# Patient Record
Sex: MALE | Race: WHITE
[De-identification: names, ages, dates, MRNs, and addresses within clinical notes are randomized per-mention and may not be internally consistent; named-entity substitution may affect disease eponyms.]

---

## 2019-09-17 ENCOUNTER — HOSPITAL ENCOUNTER (EMERGENCY)
Dept: HOSPITAL 94 - ER | Age: 29
LOS: 1 days | Discharge: HOME | End: 2019-09-18
Payer: COMMERCIAL

## 2019-09-17 VITALS — BODY MASS INDEX: 19.93 KG/M2 | HEIGHT: 75 IN | WEIGHT: 160.28 LBS

## 2019-09-17 VITALS — SYSTOLIC BLOOD PRESSURE: 110 MMHG | DIASTOLIC BLOOD PRESSURE: 74 MMHG

## 2019-09-17 DIAGNOSIS — F12.90: ICD-10-CM

## 2019-09-17 DIAGNOSIS — M54.5: Primary | ICD-10-CM

## 2019-09-17 DIAGNOSIS — Z59.0: ICD-10-CM

## 2019-09-17 DIAGNOSIS — F32.9: ICD-10-CM

## 2019-09-17 DIAGNOSIS — F41.9: ICD-10-CM

## 2019-09-17 DIAGNOSIS — Z56.0: ICD-10-CM

## 2019-09-17 DIAGNOSIS — G89.29: ICD-10-CM

## 2019-09-17 DIAGNOSIS — F17.200: ICD-10-CM

## 2019-09-17 DIAGNOSIS — Z79.899: ICD-10-CM

## 2019-09-17 PROCEDURE — 99283 EMERGENCY DEPT VISIT LOW MDM: CPT

## 2019-09-17 PROCEDURE — 96372 THER/PROPH/DIAG INJ SC/IM: CPT

## 2019-09-17 SDOH — ECONOMIC STABILITY - INCOME SECURITY: UNEMPLOYMENT, UNSPECIFIED: Z56.0

## 2019-09-17 SDOH — ECONOMIC STABILITY - HOUSING INSECURITY: HOMELESSNESS: Z59.0

## 2019-09-18 NOTE — NUR
[facility] CALL[facility] TO SEE IF OK TO SEND PT THERE AS PT REPROTS HE WAS PLANNING 
ON STAYING THERE TODAY AND HAS ALREADY". PT GIVEN NORFLEX, TORADOL AND NORCO 15 
MIN AGO.

## 2019-09-20 ENCOUNTER — HOSPITAL ENCOUNTER (EMERGENCY)
Dept: HOSPITAL 94 - ER | Age: 29
LOS: 2 days | Discharge: HOME | End: 2019-09-22
Payer: COMMERCIAL

## 2019-09-20 VITALS — WEIGHT: 160.34 LBS | HEIGHT: 75 IN | BODY MASS INDEX: 19.94 KG/M2

## 2019-09-20 DIAGNOSIS — F41.9: ICD-10-CM

## 2019-09-20 DIAGNOSIS — Z56.0: ICD-10-CM

## 2019-09-20 DIAGNOSIS — Z59.0: ICD-10-CM

## 2019-09-20 DIAGNOSIS — F12.90: ICD-10-CM

## 2019-09-20 DIAGNOSIS — R45.851: Primary | ICD-10-CM

## 2019-09-20 DIAGNOSIS — G89.29: ICD-10-CM

## 2019-09-20 DIAGNOSIS — Z79.899: ICD-10-CM

## 2019-09-20 LAB
ALBUMIN SERPL BCP-MCNC: 3.5 G/DL (ref 3.4–5)
ALBUMIN/GLOB SERPL: 1.1 {RATIO} (ref 1.1–1.5)
ALP SERPL-CCNC: 100 IU/L (ref 46–116)
ALT SERPL W P-5'-P-CCNC: 128 U/L (ref 12–78)
AMPHETAMINES UR QL SCN: NEGATIVE
ANION GAP SERPL CALCULATED.3IONS-SCNC: 7 MMOL/L (ref 8–16)
AST SERPL W P-5'-P-CCNC: 74 U/L (ref 10–37)
BARBITURATES UR QL SCN: NEGATIVE
BASOPHILS # BLD AUTO: 0.1 X10'3 (ref 0–0.2)
BASOPHILS NFR BLD AUTO: 0.7 % (ref 0–1)
BENZODIAZ UR QL SCN: NEGATIVE
BILIRUB SERPL-MCNC: 0.5 MG/DL (ref 0.1–1)
BUN SERPL-MCNC: 27 MG/DL (ref 7–18)
BUN/CREAT SERPL: 32.1 (ref 5.4–32)
BZE UR QL SCN: NEGATIVE
CALCIUM SERPL-MCNC: 8.6 MG/DL (ref 8.5–10.1)
CANNABINOIDS UR QL SCN: POSITIVE
CHLORIDE SERPL-SCNC: 106 MMOL/L (ref 99–107)
CLARITY UR: CLEAR
CO2 SERPL-SCNC: 29.7 MMOL/L (ref 24–32)
COLOR UR: YELLOW
CREAT SERPL-MCNC: 0.84 MG/DL (ref 0.6–1.1)
EOSINOPHIL # BLD AUTO: 0.1 X10'3 (ref 0–0.9)
EOSINOPHIL NFR BLD AUTO: 0.8 % (ref 0–6)
ERYTHROCYTE [DISTWIDTH] IN BLOOD BY AUTOMATED COUNT: 14 % (ref 11.5–14.5)
ETHANOL SERPL-MCNC: < 0.01 GM/DL (ref 0–0.01)
GFR SERPL CREATININE-BSD FRML MDRD: > 90 ML/MIN
GLUCOSE SERPL-MCNC: 115 MG/DL (ref 70–104)
GLUCOSE UR STRIP-MCNC: NEGATIVE MG/DL
HCT VFR BLD AUTO: 41.3 % (ref 42–52)
HGB BLD-MCNC: 13.9 G/DL (ref 14–17.9)
HGB UR QL STRIP: NEGATIVE
KETONES UR STRIP-MCNC: NEGATIVE MG/DL
LEUKOCYTE ESTERASE UR QL STRIP: NEGATIVE
LYMPHOCYTES # BLD AUTO: 3 X10'3 (ref 1.1–4.8)
LYMPHOCYTES NFR BLD AUTO: 38.8 % (ref 21–51)
MCH RBC QN AUTO: 30.2 PG (ref 27–31)
MCHC RBC AUTO-ENTMCNC: 33.8 G/DL (ref 33–36.5)
MCV RBC AUTO: 89.4 FL (ref 78–98)
METHADONE UR QL SCN: NEGATIVE
MONOCYTES # BLD AUTO: 0.6 X10'3 (ref 0–0.9)
MONOCYTES NFR BLD AUTO: 8.1 % (ref 2–12)
NEUTROPHILS # BLD AUTO: 4 X10'3 (ref 1.8–7.7)
NEUTROPHILS NFR BLD AUTO: 51.6 % (ref 42–75)
NITRITE UR QL STRIP: NEGATIVE
OPIATES UR QL SCN: NEGATIVE
PCP UR QL SCN: NEGATIVE
PH UR STRIP: 6 [PH] (ref 4.8–8)
PLATELET # BLD AUTO: 183 X10'3 (ref 140–440)
PMV BLD AUTO: 10 FL (ref 7.4–10.4)
POTASSIUM SERPL-SCNC: 3.7 MMOL/L (ref 3.5–5.1)
PROT SERPL-MCNC: 6.7 G/DL (ref 6.4–8.2)
PROT UR QL STRIP: NEGATIVE MG/DL
RBC # BLD AUTO: 4.62 X10'6 (ref 4.7–6.1)
SODIUM SERPL-SCNC: 143 MMOL/L (ref 135–145)
SP GR UR STRIP: 1.02 (ref 1–1.03)
URN COLLECT METHOD CLASS: (no result)
UROBILINOGEN UR STRIP-MCNC: 1 E.U/DL (ref 0.2–1)
WBC # BLD AUTO: 7.8 X10'3 (ref 4.5–11)

## 2019-09-20 PROCEDURE — 84443 ASSAY THYROID STIM HORMONE: CPT

## 2019-09-20 PROCEDURE — 80320 DRUG SCREEN QUANTALCOHOLS: CPT

## 2019-09-20 PROCEDURE — 99285 EMERGENCY DEPT VISIT HI MDM: CPT

## 2019-09-20 PROCEDURE — 36415 COLL VENOUS BLD VENIPUNCTURE: CPT

## 2019-09-20 PROCEDURE — 81003 URINALYSIS AUTO W/O SCOPE: CPT

## 2019-09-20 PROCEDURE — 80053 COMPREHEN METABOLIC PANEL: CPT

## 2019-09-20 PROCEDURE — 85025 COMPLETE CBC W/AUTO DIFF WBC: CPT

## 2019-09-20 PROCEDURE — 80305 DRUG TEST PRSMV DIR OPT OBS: CPT

## 2019-09-20 SDOH — ECONOMIC STABILITY - INCOME SECURITY: UNEMPLOYMENT, UNSPECIFIED: Z56.0

## 2019-09-20 SDOH — ECONOMIC STABILITY - HOUSING INSECURITY: HOMELESSNESS: Z59.0

## 2019-09-20 NOTE — NUR
Went in an assessed pt, pt was eaily arousable but was very fatigued and would 
fall back to sleep, pt answered yes/no to questions asked regarding mental 
status. pt said yes he still feels suicidal and that he would use a gun to kill 
himself.

## 2019-09-21 NOTE — NUR
Nurse to nurse report to PAL Orourke at [facility]. Additional labs will be faxed. 
Patients social security number does not match his name. [facility] would like SS 
# V

## 2019-09-21 NOTE — NUR
Patient is resting quietly. He has eaten a sandwich. Zyprexa and Ativan have 
been admistered. The patient is medication compliant and cooperative at this 
time.

## 2019-09-21 NOTE — NUR
Client is sleeping peacefully on right side with no signs of Distress.  
Respirations are even and unlabored.

## 2019-09-21 NOTE — NUR
Patient is awake and well oriented. His affect is flat. When asked how he is 
feeling the patient replies "I've been better. I'm depressed. I've just got to 
OD on drugs and die." Patient states he is overwhelmed. "I always hear voices 
that I can't understand." Patient is living on the streets. This patient ate 
his dinner. No psych meds are currently ordered. This writer will consult with 
the ER MD.

The patient is in direct view from the nursing station. Frequent rounding will 
be done for patient safety.

## 2019-09-21 NOTE — NUR
Report on this patient to PAL Orourke at [facility]. They would like updated labs, 
this will be faxed. [facility] would like verification on patients idenity, his 
SS number comes up to another name. A follow up call will be made to [facility] 
on this matter.

## 2019-09-22 VITALS — SYSTOLIC BLOOD PRESSURE: 109 MMHG | DIASTOLIC BLOOD PRESSURE: 55 MMHG

## 2019-09-22 NOTE — NUR
No behavioral issues and no somatic complaints. Currently resting with eyes 
closed in his bed. Respirations are even and unlabored at this time.

## 2019-09-22 NOTE — NUR
Received report on patient who was resting in bed to begin this shift. Alert 
and oriented x 4 and compliant with assessment and medications this am. No 
somatic complaints noted.

## 2019-09-22 NOTE — NUR
Resting in bed with eyes closed. Even, unlabored respirations were present. No 
behavioral issues have been noted as of this writing.

## 2019-09-24 ENCOUNTER — HOSPITAL ENCOUNTER (EMERGENCY)
Dept: HOSPITAL 94 - ER | Age: 29
LOS: 1 days | Discharge: HOME | End: 2019-09-25
Payer: COMMERCIAL

## 2019-09-24 VITALS — WEIGHT: 160.28 LBS | BODY MASS INDEX: 19.93 KG/M2 | HEIGHT: 75 IN

## 2019-09-24 DIAGNOSIS — M54.9: ICD-10-CM

## 2019-09-24 DIAGNOSIS — F12.90: ICD-10-CM

## 2019-09-24 DIAGNOSIS — F32.9: Primary | ICD-10-CM

## 2019-09-24 DIAGNOSIS — Z56.0: ICD-10-CM

## 2019-09-24 DIAGNOSIS — Z59.0: ICD-10-CM

## 2019-09-24 DIAGNOSIS — F41.9: ICD-10-CM

## 2019-09-24 DIAGNOSIS — G89.29: ICD-10-CM

## 2019-09-24 LAB
ALBUMIN SERPL BCP-MCNC: 3.8 G/DL (ref 3.4–5)
ALBUMIN/GLOB SERPL: 1.2 {RATIO} (ref 1.1–1.5)
ALP SERPL-CCNC: 102 IU/L (ref 46–116)
ALT SERPL W P-5'-P-CCNC: 139 U/L (ref 12–78)
AMPHETAMINES UR QL SCN: NEGATIVE
ANION GAP SERPL CALCULATED.3IONS-SCNC: 8 MMOL/L (ref 8–16)
AST SERPL W P-5'-P-CCNC: 67 U/L (ref 10–37)
BACTERIA URNS QL MICRO: (no result) /HPF
BARBITURATES UR QL SCN: NEGATIVE
BASOPHILS # BLD AUTO: 0.1 X10'3 (ref 0–0.2)
BASOPHILS NFR BLD AUTO: 0.7 % (ref 0–1)
BENZODIAZ UR QL SCN: NEGATIVE
BILIRUB SERPL-MCNC: 0.4 MG/DL (ref 0.1–1)
BUN SERPL-MCNC: 29 MG/DL (ref 7–18)
BUN/CREAT SERPL: 26.9 (ref 5.4–32)
BZE UR QL SCN: NEGATIVE
CALCIUM SERPL-MCNC: 9.2 MG/DL (ref 8.5–10.1)
CANNABINOIDS UR QL SCN: POSITIVE
CAOX CRY URNS QL MICRO: (no result) /HPF
CHLORIDE SERPL-SCNC: 106 MMOL/L (ref 99–107)
CLARITY UR: (no result)
CO2 SERPL-SCNC: 31.5 MMOL/L (ref 24–32)
COLOR UR: (no result)
CREAT SERPL-MCNC: 1.08 MG/DL (ref 0.6–1.1)
DEPRECATED SQUAMOUS URNS QL MICRO: (no result) /LPF
EOSINOPHIL # BLD AUTO: 0.1 X10'3 (ref 0–0.9)
EOSINOPHIL NFR BLD AUTO: 0.7 % (ref 0–6)
ERYTHROCYTE [DISTWIDTH] IN BLOOD BY AUTOMATED COUNT: 13.7 % (ref 11.5–14.5)
ETHANOL SERPL-MCNC: < 0.01 GM/DL (ref 0–0.01)
GFR SERPL CREATININE-BSD FRML MDRD: 80 ML/MIN
GLUCOSE SERPL-MCNC: 101 MG/DL (ref 70–104)
GLUCOSE UR STRIP-MCNC: NEGATIVE MG/DL
HCT VFR BLD AUTO: 42.7 % (ref 42–52)
HGB BLD-MCNC: 14.4 G/DL (ref 14–17.9)
HGB UR QL STRIP: NEGATIVE
KETONES UR STRIP-MCNC: (no result) MG/DL
LEUKOCYTE ESTERASE UR QL STRIP: NEGATIVE
LYMPHOCYTES # BLD AUTO: 3.1 X10'3 (ref 1.1–4.8)
LYMPHOCYTES NFR BLD AUTO: 29.9 % (ref 21–51)
MCH RBC QN AUTO: 29.9 PG (ref 27–31)
MCHC RBC AUTO-ENTMCNC: 33.7 G/DL (ref 33–36.5)
MCV RBC AUTO: 88.9 FL (ref 78–98)
METHADONE UR QL SCN: NEGATIVE
MONOCYTES # BLD AUTO: 0.8 X10'3 (ref 0–0.9)
MONOCYTES NFR BLD AUTO: 7.7 % (ref 2–12)
MUCOUS THREADS URNS QL MICRO: (no result) /LPF
NEUTROPHILS # BLD AUTO: 6.4 X10'3 (ref 1.8–7.7)
NEUTROPHILS NFR BLD AUTO: 61 % (ref 42–75)
NITRITE UR QL STRIP: NEGATIVE
OPIATES UR QL SCN: NEGATIVE
PCP UR QL SCN: NEGATIVE
PH UR STRIP: 5.5 [PH] (ref 4.8–8)
PLATELET # BLD AUTO: 202 X10'3 (ref 140–440)
PMV BLD AUTO: 9.8 FL (ref 7.4–10.4)
POTASSIUM SERPL-SCNC: 4.1 MMOL/L (ref 3.5–5.1)
PROT SERPL-MCNC: 6.9 G/DL (ref 6.4–8.2)
PROT UR QL STRIP: (no result) MG/DL
RBC # BLD AUTO: 4.8 X10'6 (ref 4.7–6.1)
RBC #/AREA URNS HPF: (no result) /HPF (ref 0–2)
SODIUM SERPL-SCNC: 145 MMOL/L (ref 135–145)
SP GR UR STRIP: 1.02 (ref 1–1.03)
URN COLLECT METHOD CLASS: (no result)
UROBILINOGEN UR STRIP-MCNC: 1 E.U/DL (ref 0.2–1)
WBC # BLD AUTO: 10.5 X10'3 (ref 4.5–11)
WBC #/AREA URNS HPF: (no result) /HPF (ref 0–4)

## 2019-09-24 PROCEDURE — 85025 COMPLETE CBC W/AUTO DIFF WBC: CPT

## 2019-09-24 PROCEDURE — 99284 EMERGENCY DEPT VISIT MOD MDM: CPT

## 2019-09-24 PROCEDURE — 80053 COMPREHEN METABOLIC PANEL: CPT

## 2019-09-24 PROCEDURE — 80320 DRUG SCREEN QUANTALCOHOLS: CPT

## 2019-09-24 PROCEDURE — 80305 DRUG TEST PRSMV DIR OPT OBS: CPT

## 2019-09-24 PROCEDURE — 81001 URINALYSIS AUTO W/SCOPE: CPT

## 2019-09-24 PROCEDURE — 36415 COLL VENOUS BLD VENIPUNCTURE: CPT

## 2019-09-24 SDOH — ECONOMIC STABILITY - INCOME SECURITY: UNEMPLOYMENT, UNSPECIFIED: Z56.0

## 2019-09-24 SDOH — ECONOMIC STABILITY - HOUSING INSECURITY: HOMELESSNESS: Z59.0

## 2019-09-24 NOTE — NUR
Shook pt's bed multiple times in an attempt to wake pt up for med 
administration. Pt moaned and shook his head. Non-admin pt uncooperative.

## 2019-09-25 VITALS — DIASTOLIC BLOOD PRESSURE: 69 MMHG | SYSTOLIC BLOOD PRESSURE: 116 MMHG

## 2019-09-25 NOTE — NUR
Patient was seen by [facility] and will be released.  Patient is getting dressed and 
cleaning up in the Bathroom.  Continue to monitor.

## 2019-09-25 NOTE — NUR
Patient awake and alert and asking for coffee.  No distress observed.  Patient 
denies suicidal ideation at this time.  Patient states "I'm not as crazy as I 
thought".  Patient is ready to go back on the streets.  Patient states he has a 
ride at noon who is going to take patient to [place] and wants to leave.  RN 
explained he would have to wait for [facility] to see him.  They would decide whether 
he is able to leave or not.  Patient verbalized understanding.  Continue to 
monitor.

## 2019-09-25 NOTE — NUR
Pt sitting up in bed shouting out that he needs something for sleep. Pt stating 
he "hasn't slept all night". Reminded pt he would not awake for med 
administration. Pt then made whine-like noises, stomped feet in bed, and fanned 
covers about.

## 2019-09-25 NOTE — NUR
Patient sleeping supine.  No restlessness observed.  Patient is in line of site 
of staff at all times.

## 2019-10-18 ENCOUNTER — HOSPITAL ENCOUNTER (INPATIENT)
Dept: HOSPITAL 94 - ADULT MH | Age: 29
LOS: 3 days | Discharge: ANOTHER HOSPITAL | DRG: 751 | End: 2019-10-21
Attending: PSYCHIATRY & NEUROLOGY | Admitting: PSYCHIATRY & NEUROLOGY
Payer: MEDICAID

## 2019-10-18 VITALS — WEIGHT: 183.42 LBS | HEIGHT: 74 IN | BODY MASS INDEX: 23.54 KG/M2

## 2019-10-18 VITALS — DIASTOLIC BLOOD PRESSURE: 87 MMHG | SYSTOLIC BLOOD PRESSURE: 130 MMHG

## 2019-10-18 DIAGNOSIS — F12.90: ICD-10-CM

## 2019-10-18 DIAGNOSIS — Z59.0: ICD-10-CM

## 2019-10-18 DIAGNOSIS — G40.909: ICD-10-CM

## 2019-10-18 DIAGNOSIS — F17.210: ICD-10-CM

## 2019-10-18 DIAGNOSIS — Z76.5: ICD-10-CM

## 2019-10-18 DIAGNOSIS — Z79.899: ICD-10-CM

## 2019-10-18 DIAGNOSIS — G89.29: ICD-10-CM

## 2019-10-18 DIAGNOSIS — R73.09: ICD-10-CM

## 2019-10-18 DIAGNOSIS — F60.9: ICD-10-CM

## 2019-10-18 DIAGNOSIS — J44.9: ICD-10-CM

## 2019-10-18 DIAGNOSIS — F32.3: Primary | ICD-10-CM

## 2019-10-18 PROCEDURE — 83036 HEMOGLOBIN GLYCOSYLATED A1C: CPT

## 2019-10-18 PROCEDURE — 80061 LIPID PANEL: CPT

## 2019-10-18 PROCEDURE — 36415 COLL VENOUS BLD VENIPUNCTURE: CPT

## 2019-10-18 PROCEDURE — 87081 CULTURE SCREEN ONLY: CPT

## 2019-10-18 RX ADMIN — NICOTINE POLACRILEX PRN LOZ: 2 LOZENGE ORAL at 18:32

## 2019-10-18 SDOH — ECONOMIC STABILITY - HOUSING INSECURITY: HOMELESSNESS: Z59.0

## 2019-10-18 NOTE — NUR
Admission note:

 [facility]. Client was met in the front of hospital by 2 
staff members and was escorted to unit by those 2 staff members as well as a member of 
Security. Client was amicable to the admission and was cooperative with the process.Original 
5150 accompanied client and it was delivered to Darío(MOISES) by this writer.Involuntary 
advisement rendered. Safety search and inventory was conducted by Alcides([facility]). Client was 
escorted to shower where a skin check was performed by 2 RN staff members. Client has a scar 
on left hip from I and D of 5 years ago. MRSA swab was collected and sent to the Lab for 
processing.Vital signs upon admit were' Gt=190/77, P=102,R=16 and T was 98.2. His o2 sats 
were 97% on RA. Client is alert and oriented x 4 and understands the reasons for this 
hospitalization.. All assessments (admission) were conducted by Jluis NARVAEZ. This client is 80 
inches tall and his admission weight was 183 lbs. No current somatic complaints. Client was 
oriented to the unit and staff and has been oriented to unit rules. Client did verbally 
contract for safe unit behaviors.

## 2019-10-19 VITALS — SYSTOLIC BLOOD PRESSURE: 117 MMHG | DIASTOLIC BLOOD PRESSURE: 60 MMHG

## 2019-10-19 VITALS — DIASTOLIC BLOOD PRESSURE: 81 MMHG | SYSTOLIC BLOOD PRESSURE: 122 MMHG

## 2019-10-19 RX ADMIN — NICOTINE SCH PATCH: 21 PATCH, EXTENDED RELEASE TRANSDERMAL at 07:59

## 2019-10-19 NOTE — NUR
Nursing Progress Note: Jluis "Gary"



Legal hold: 5150



Client on voluntary/involuntary status for DTS.



Report received from SOLANGE Chanel with use of SBAR.



Why are they here:Admission note:

 Client arrived via [facility]. Client was met in the front of hospital by 2 
staff members and was escorted to unit by those 2 staff members as well as a member of 
Security. Client was amicable to the admission and was cooperative with the process.Original 
5150 accompanied client and it was delivered to Darío (MOISES) by this writer.Involuntary 
advisement rendered. Safety search and inventory was conducted by Alcides(T). Client was 
escorted to shower where a skin check was performed by 2 RN staff members. Client has a scar 
on left hip from I and D of 5 years ago. MRSA swab was collected and sent to the Lab for 
processing.Vital signs upon admit were' Wt=765/77, P=102,R=16 and T was 98.2. His o2 sats 
were 97% on RA. Client is alert and oriented x 4 and understands the reasons for this 
hospitalization.. All assessments (admission) were conducted by Jluis NARVAEZ. This client is 80 
inches tall and his admission weight was 183 lbs. No current somatic complaints. Client was 

oriented to the unit and staff and has been oriented to unit rules. Client did verbally 
contract for safe unit behaviors.



Assessment



What has happened this shift: 

Patient in bed sleeping at the change of shift. He sleeps the majority of the evening. he is 
cooperative for a 1:1 assessment at his bedside. No signs of being labile this evening. But 
is demanding wanting food and medications immediately when asked. Patient is instructed that 
these things will be provided for him but he has to be patient and allow time for staff to 
obtain them. Patient is given a PB&J and chips for evening snack this evening he eats it 
with out any difficult this shift and no complaints of nausea. Patient does report 
indigestion which Maalox is provided for him with good effect. Patient is complaint with 
evening medications and goes back to bed shortly after HS medication pass. 

  

S/I, H/I: Denies 

A/VH: Denies. 

Sleep: Currently sleeping, see sleep assessment

ADL's: Independent

Group attendance:  No groups this shift.

Were meds taken: Patient is medication compliant.

Any med S/E: None. 



Mental Status Exam



Appearance: Hair unkempt, wearing clean street clothing

Eye contact: Direct.  

Behavior:Isolative, keeps to self

Speech: Normal tone, rate, and rhythm. 

Mood: Calm, euthymic

Affect: Congruent with mood

Thought process: Linear.  

Thought Content: Focused on medication and food this evening

Cognition: A& Ox3

Insight: Poor.

Judgment: Poor



PRN's used: Maalox

Therapeutic interventions 1:1 assessment, active listening, therapeutic conversation, 
encouragement of autonomy, positive reinforcement, medication 
administration/education/monitoring, Q 15 min safety checks. 



Restraints/seclusion/emergency medication: N/A



Justification of Continued Inpatient Treatment: Continued therapeutic support and medication 
management needed to provide stabilization, prevent decompensation, decreasing risk to 
patient and readmittance.

## 2019-10-19 NOTE — NUR
Nursing Progress Note: Jluis "Gary"



Legal hold: 5150



Client on voluntary/involuntary status for DTS.



Report received from SOLANGE Valencia with use of SBAR.



Why are they here:Admission note:

 Client arrived via [facility]. Client was met in the front of hospital by 2 
staff members and was escorted to unit by those 2 staff members as well as a member of 
Security. Client was amicable to the admission and was cooperative with the process.Original 
5150 accompanied client and it was delivered to Darío MUÑIZ) by this writer.Involuntary 
advisement rendered. Safety search and inventory was conducted by Alcides(T). Client was 
escorted to shower where a skin check was performed by 2 RN staff members. Client has a scar 
on left hip from I and D of 5 years ago. MRSA swab was collected and sent to the Lab for 
processing.Vital signs upon admit were' Ig=275/77, P=102,R=16 and T was 98.2. His o2 sats 
were 97% on RA. Client is alert and oriented x 4 and understands the reasons for this 
hospitalization.. All assessments (admission) were conducted by Jluis NARVAEZ. This client is 80 
inches tall and his admission weight was 183 lbs. No current somatic complaints. Client was 

oriented to the unit and staff and has been oriented to unit rules. Client did verbally 
contract for safe unit behaviors.



Assessment



What has happened this shift: Patient is awake and well oriented. He is ambulatory around 
the unit, he watches television. Post shift change patient complained of increasing anxiety, 
he also requested nicotine lozenges. and was administered a nictotine patch, and atarax for 
his agitation.He seems almost euphoric, laughing and attempting to be comical (sharing 
flatulence). Became very agitated during snack, has complained that the meal portions on not 
acceptable, he is always hungry and wanted a [employer] which wasn't available. He directed 
himself to his room to calm down. Reviewed medication orders and provided with Ativan. 
Orders faxed again to pharmacy. Became agitated when lunch tray was served with regular 
(versus double) portion. While complaining stated he needed to "throw up" and did so in the 
[facility]entine bathroom. shared that he is very depressed as he just wants to get back to [place] where 
his friends are. He left there approximately 5 years ago and has been unable to travel 
back.Requested an early snack due to vomiting his lunch, immediately after consuming, he 
again vomited. encouraged patient to rest and await evaluation by provider. Becomes very 
agitated when told to wait for food, or if he doesn't get what he wants

  

S/I, H/I: Denies 

A/VH: Denies. 

Sleep: 9.25 

ADL's: Independent. 

Group attendance:  Yes morning, refused art therapy

Were meds taken: Patient is medication compliant.

Any med S/E: None. 





Mental Status Exam



Appearance:Clean, dressed well. 

Eye contact:Direct.  

Behavior:euphoric, laughing, anxious at times 

Speech:Normal tone, rate, and rhythm. 

Mood: labile

Affect: Congruent with mood

Thought process:Linear.  

Thought Content:wanting to be admitted to rehab facility

Cognition:Alert and oriented to person, place, time, and situation.  

Insight:Poor.

Judgment:Poor



PRN's used:Nicotine, Atarax, Ativan

Therapeutic interventions 1:1 assessment, active listening, therapeutic conversation, 
encouragement of autonomy, 



positive reinforcement, medication administration/education/monitoring, Q 15 min safety 
checks. 



Restraints/seclusion/emergency medication: N/A



Justification of Continued Inpatient Treatment: Continued therapeutic support and medication 
management needed 



to provide stabilization, prevent decompensation, decreasing risk to patient and 
readmittance.

## 2019-10-19 NOTE — NUR
Nursing Progress Note:



Legal hold: 5150



Client on voluntary/involuntary status for DTS.



Report received from SOLANGE Bazzi with use of SBAR.



Why are they here:Admission note:

 Client arrived via [facility] from [facility]. Client was met in the front of hospital by 2 
staff members and was escorted to unit by those 2 staff members as well as a member of 
Security. Client was amicable to the admission and was cooperative with the process.Original 
5150 accompanied client and it was delivered to Smith) by this writer.Involuntary 
advisement rendered. Safety search and inventory was conducted by Alcides([facility]). Client was 
escorted to shower where a skin check was performed by 2 RN staff members. Client has a scar 
on left hip from I and D of 5 years ago. MRSA swab was collected and sent to the Lab for 
processing.Vital signs upon admit were' Bq=843/77, P=102,R=16 and T was 98.2. His o2 sats 
were 97% on RA. Client is alert and oriented x 4 and understands the reasons for this 
hospitalization.. All assessments (admission) were conducted by Jluis NARVAEZ. This client is 80 
inches tall and his admission weight was 183 lbs. No current somatic complaints. Client was 
oriented to the unit and staff and has been oriented to unit rules. Client did verbally 
contract for safe unit behaviors.



Assessment



What has happened this shift: Patient is awake and well oriented. He is ambulatory around 
the unit, he watches television. Post shift change patient complained of increasing anxiety, 
he also requested nicotine lozenges. Patient has a complaint of low back pain, he does not 
present with any sign of pain, his complaint is subjective. Patient denies S/I or H/I. 
Patient states "not having suicidal ideation could change, you never know." He complains of 
depression. Patient states the depression is present as "I can't do anything for myself 
anymore." Patient states "I''m Bipolar, Schizophrenic, and I have a split personality." This 
patient is medication compliant. He did request Ativan but had fallen asleep prior to being 
administered. The patient was advised by this writer that he is in a safe place. Q15 minute 
rounding is being done for patient safety.

  

S/I, H/I:Patient denies at this time. See above note. 

A/VH: Denies. 

Sleep:Sleeping well, will tally hours in am. 

ADL's:Independant. 

Group attendance: No group on night shift. 

Were meds taken:Patient is medication compliant.

Any med S/E: None. 





Mental Status Exam



Appearance:Clean, dressed well. 

Eye contact:Direct.  

Behavior:Calm and cooperative with staff and other patients. 

Speech:Normal tone, rate, and rhythm. 

Mood:Minor depression and anxiety.

Affect:Flat.

Thought process:Patient states he doesn't know what to do with his life.  

Thought Content:Anxiety about living conditions.

Cognition:Alert and oriented to person, place, time, and situation.  

Insight:Poor.

Judgment:Poor



PRN's used:Nicotine, Atarax. 

Therapeutic interventions 1:1 assessment, active listening, therapeutic conversation, 
encouragement of autonomy, positive reinforcement, medication 
administration/education/monitoring, Q 15 min safety checks. 



Restraints/seclusion/emergency medication: N/A



Justification of Continued Inpatient Treatment: Continued therapeutic support and medication 
management needed to provide stabilization, prevent decompensation, decreasing risk to 
patient and readmittance.

## 2019-10-19 NOTE — NUR
Patient awoke, complains of anxiety and inability to sleep. PO Ativan given. Patient 
returned to bed.

## 2019-10-20 VITALS — SYSTOLIC BLOOD PRESSURE: 114 MMHG | DIASTOLIC BLOOD PRESSURE: 76 MMHG

## 2019-10-20 VITALS — SYSTOLIC BLOOD PRESSURE: 106 MMHG | DIASTOLIC BLOOD PRESSURE: 67 MMHG

## 2019-10-20 LAB
CHOLEST SERPL-MCNC: 193 MG/DL (ref 0–200)
CHOLEST/HDLC SERPL: 2.9 {RATIO} (ref 0–4.99)
HDLC SERPL-MCNC: 66 MG/DL (ref 35–60)
LDLC SERPL DIRECT ASSAY-MCNC: 113 MG/DL (ref 50–100)
TRIGL SERPL-MCNC: 109 MG/DL (ref 20–135)

## 2019-10-20 RX ADMIN — NICOTINE POLACRILEX PRN LOZ: 2 LOZENGE ORAL at 08:20

## 2019-10-20 RX ADMIN — NICOTINE SCH PATCH: 21 PATCH, EXTENDED RELEASE TRANSDERMAL at 08:03

## 2019-10-20 NOTE — NUR
Nursing Progress Note: Jluis "Gary"



Legal hold: 5150



Client on involuntary status for DTS.



Report received from SOLANGE Valencia with use of SBAR.



Why are they here:Admission note:

 Client arrived via [facility] from [facility]. Client was met in the front of hospital by 2 
staff members and was escorted to unit by those 2 staff members as well as a member of 
Security. Client was amicable to the admission and was cooperative with the process.Original 
5150 accompanied client and it was delivered to Darío MUÑIZ) by this writer.Involuntary 
advisement rendered. Safety search and inventory was conducted by Alcides(T). Client was 
escorted to shower where a skin check was performed by 2 RN staff members. Client has a scar 
on left hip from I and D of 5 years ago. MRSA swab was collected and sent to the Lab for 
processing.Vital signs upon admit were' Gy=302/77, P=102,R=16 and T was 98.2. His o2 sats 
were 97% on RA. Client is alert and oriented x 4 and understands the reasons for this 
hospitalization.. All assessments (admission) were conducted by Jluis NARVAEZ. This client is 80 
inches tall and his admission weight was 183 lbs. No current somatic complaints. Client was 

oriented to the unit and staff and has been oriented to unit rules. Client did verbally 
contract for safe unit behaviors.



Assessment



What has happened this shift: 

Pt up and visible on the unit. Pt entitled and has been focused on food. At breakfast and 
snack, he is trying to get more than his share of food. Pt was upset, but has been able to 
express that appropriately. Pt did state that he wants to leave today and he denies S.I. And 
stated he hasnt been suicidal since he was admitted. He said, to tell you the truth, I 
actually come in to these places so I can get [employer] bus tickets to other states. Pt less 
edgy after lunch. Pt took a nap and then met with the PA and is happy to be leaving 
tomorrow.

  

S/I, H/I: Denies 

A/VH: Denies. 

Sleep: nap x 2 

ADL's: Independent. 

Group attendance:  refused

Were meds taken: Patient is medication compliant.

Any med S/E: None. 





Mental Status Exam



Appearance:Clean, dressed well. 

Eye contact:Direct.  

Behavior:euphoric, laughing, anxious at times 

Speech:Normal tone, rate, and rhythm. 

Mood: labile

Affect: Congruent with mood

Thought process:Linear.  

Thought Content:wanting to be admitted to rehab facility

Cognition:Alert and oriented to person, place, time, and situation.  

Insight:Poor.

Judgment:Poor



PRN's used:Nicotine

Therapeutic interventions 1:1 assessment, active listening, therapeutic conversation, 
encouragement of autonomy, 



positive reinforcement, medication administration/education/monitoring, Q 15 min safety 
checks. 



Restraints/seclusion/emergency medication: N/A



Justification of Continued Inpatient Treatment: Continued therapeutic support and medication 
management needed to provide stabilization, prevent decompensation, decreasing risk to 
patient and readmittance.

## 2019-10-21 VITALS — DIASTOLIC BLOOD PRESSURE: 70 MMHG | SYSTOLIC BLOOD PRESSURE: 106 MMHG

## 2019-10-21 RX ADMIN — NICOTINE POLACRILEX PRN LOZ: 2 LOZENGE ORAL at 07:38

## 2019-10-21 RX ADMIN — NICOTINE POLACRILEX PRN LOZ: 2 LOZENGE ORAL at 09:34

## 2019-10-21 RX ADMIN — NICOTINE SCH PATCH: 21 PATCH, EXTENDED RELEASE TRANSDERMAL at 07:31

## 2019-10-21 NOTE — NUR
Nursing Progress Note: Jluis Nguyễn"



Legal hold: 5150



Client on involuntary status for DTS.



Report received from SOLANGE Valencia with use of SBAR.



Why are they here:

5150,  Pt was evaluated at [facility] because he was making suicidal 
statements and reporting he 

was confused.  Pt is making suicidal statements which he attributes to feeling "confused" 
and feeling like he's 

"loosing control" Pt recently attempted suicide by hanging himself.



Pt was seen twice at [facility] ER and was released. Pt reported that he had been given med when 
he was hospitalized in [facility]. Pt lost the meds.

Assessment



What has happened this shift: 

Pt in bed at start of shift.  He immediately asked when he would get medications.  Plan made 
with pt agreement to wake him up (If sleeping) for snack then give him his HS meds.  Pt 
stated he was unable to get up because he had a seizure earlier, shortly after saying that 
he got up without any difficulty.     Pt is aware he is being discharged tomorrow back to 
[place]. Pt said he is very tired went to sleep.   Awakened easily for snack and meds then 
went back to sleep Sleeping at  this time. 

  

S/I, H/I: Denies 

A/VH: Denies. 

Sleep: Asleep most of shit. 

ADL's: Independent. 

Group attendance:  refused

Were meds taken: Patient is medication compliant.

Any med S/E: None. 





Mental Status Exam



Appearance: Clean, dressed well. 

Eye contact:Direct.  

Behavior:  Irritable at start of shift became more pleasant and cooperative. 

Speech:Normal tone, rate, and rhythm. 

Mood: labile

Affect: Congruent with mood

Thought process:Linear.  

Thought Content: DC to [place]

Cognition:Alert and oriented to person, place, time, and situation.  

Insight:Poor.

Judgment:Poor



PRN's used: Tylenol

Therapeutic interventions 1:1 assessment, active listening, therapeutic conversation, 
encouragement of autonomy,   Positive reinforcement, medication 
administration/education/monitoring, Q 15 min safety checks. 



Restraints/seclusion/emergency medication: N/A



Justification of Continued Inpatient Treatment: Continued therapeutic support and medication 
management needed to provide stabilization, prevent decompensation, decreasing risk to 
patient and readmittance.

## 2019-10-21 NOTE — NUR
Discharge Note:

Pt picked up and discharged by [facility]  at 1220. Discharge instructions 
including medications reviewed with pt and he verbalized understanding and he signed and he 
was given a copy. Pt medications called into [facility]. All belongings returned to 
the patient and he verbalized and signed that he received all his stuff. Pt sent with 
smoking cessation information.